# Patient Record
Sex: MALE | Race: WHITE | Employment: OTHER | URBAN - METROPOLITAN AREA
[De-identification: names, ages, dates, MRNs, and addresses within clinical notes are randomized per-mention and may not be internally consistent; named-entity substitution may affect disease eponyms.]

---

## 2017-11-09 ENCOUNTER — OFFICE VISIT (OUTPATIENT)
Dept: UROLOGY | Facility: CLINIC | Age: 82
End: 2017-11-09
Payer: COMMERCIAL

## 2017-11-09 VITALS
WEIGHT: 180 LBS | DIASTOLIC BLOOD PRESSURE: 70 MMHG | BODY MASS INDEX: 29.99 KG/M2 | HEART RATE: 60 BPM | HEIGHT: 65 IN | SYSTOLIC BLOOD PRESSURE: 140 MMHG

## 2017-11-09 DIAGNOSIS — R97.20 ELEVATED PROSTATE SPECIFIC ANTIGEN (PSA): Primary | ICD-10-CM

## 2017-11-09 DIAGNOSIS — N40.0 ENLARGED PROSTATE: ICD-10-CM

## 2017-11-09 LAB — PSA SERPL-MCNC: 0.76 NG/ML (ref 0–4)

## 2017-11-09 PROCEDURE — 84153 ASSAY OF PSA TOTAL: CPT | Performed by: UROLOGY

## 2017-11-09 PROCEDURE — 99212 OFFICE O/P EST SF 10 MIN: CPT | Performed by: UROLOGY

## 2017-11-09 PROCEDURE — 36415 COLL VENOUS BLD VENIPUNCTURE: CPT | Performed by: UROLOGY

## 2017-11-09 RX ORDER — DUTASTERIDE 0.5 MG/1
0.5 CAPSULE, LIQUID FILLED ORAL DAILY
Qty: 90 CAPSULE | Refills: 3 | Status: SHIPPED | OUTPATIENT
Start: 2017-11-09

## 2017-11-09 RX ORDER — TAMSULOSIN HYDROCHLORIDE 0.4 MG/1
0.8 CAPSULE ORAL DAILY
Qty: 90 CAPSULE | Refills: 3 | Status: SHIPPED | OUTPATIENT
Start: 2017-11-09

## 2017-11-09 ASSESSMENT — PAIN SCALES - GENERAL: PAINLEVEL: MODERATE PAIN (5)

## 2017-11-09 NOTE — MR AVS SNAPSHOT
"              After Visit Summary   11/9/2017    Cristopher Guillen    MRN: [id number]           Patient Information     Date Of Birth          1935        Visit Information        Provider Department      11/9/2017 10:30 AM Sharif Loaiza MD; [employer] [facility] [place]        Today's Diagnoses     Elevated prostate specific antigen (PSA)    -  1    Enlarged prostate           Follow-ups after your visit        Follow-up notes from your care team     Return if symptoms worsen or fail to improve.      Who to contact     If you have questions or need follow up information about today's clinic visit or your schedule please contact [facility] directly at [phone].  Normal or non-critical lab and imaging results will be communicated to you by MyChart, letter or phone within 4 business days after the clinic has received the results. If you do not hear from us within 7 days, please contact the clinic through [employer] or phone. If you have a critical or abnormal lab result, we will notify you by phone as soon as possible.  Submit refill requests through [employer] or call your pharmacy and they will forward the refill request to us. Please allow 3 business days for your refill to be completed.          Additional Information About Your Visit        MyChart Information     [employer] lets you send messages to your doctor, view your test results, renew your prescriptions, schedule appointments and more. To sign up, go to [email or web address] . Click on \"Log in\" on the left side of the screen, which will take you to the Welcome page. Then click on \"Sign up Now\" on the right side of the page.     You will be asked to enter the access code listed below, as well as some personal information. Please follow the directions to create your username and password.     Your access code is: [email or web address]  Expires: 2/7/2018 11:22 AM   " "  Your access code will  in 90 days. If you need help or a new code, please call your [facility] or [phone].        Care EveryWhere ID     This is your Care EveryWhere ID. This could be used by other organizations to access your [facility] medical records  [id number]        Your Vitals Were     Pulse Height BMI (Body Mass Index)             60 1.651 m (5' 5\") 29.95 kg/m2          Blood Pressure from Last 3 Encounters:   17 140/70   16 118/58    Weight from Last 3 Encounters:   17 81.6 kg (180 lb)   16 83 kg (183 lb)              We Performed the Following     PSA Diag Urologic Phys          Today's Medication Changes          These changes are accurate as of: 17 11:22 AM.  If you have any questions, ask your nurse or doctor.               These medicines have changed or have updated prescriptions.        Dose/Directions    * dutasteride 0.5 MG capsule   Commonly known as:  AVODART   This may have changed:  Another medication with the same name was added. Make sure you understand how and when to take each.   Changed by:  Sharif Loaiza MD        Dose:  0.5 mg   Take 0.5 mg by mouth daily   Refills:  0       * dutasteride 0.5 MG capsule   Commonly known as:  AVODART   This may have changed:  You were already taking a medication with the same name, and this prescription was added. Make sure you understand how and when to take each.   Used for:  Enlarged prostate   Changed by:  Sharif Loaiza MD        Dose:  0.5 mg   Take 1 capsule (0.5 mg) by mouth daily   Quantity:  90 capsule   Refills:  3       * tamsulosin 80 mcg/mL Susp   Commonly known as:  FLOMAX   This may have changed:  Another medication with the same name was added. Make sure you understand how and when to take each.   Changed by:  Sharif Loaiza MD        Dose:  400 mcg   Take 400 mcg by mouth daily   Refills:  0       * tamsulosin 0.4 MG capsule   Commonly known as:  FLOMAX   This may " have changed:  You were already taking a medication with the same name, and this prescription was added. Make sure you understand how and when to take each.   Used for:  Enlarged prostate   Changed by:  Sharif Loaiza MD        Dose:  0.8 mg   Take 2 capsules (0.8 mg) by mouth daily   Quantity:  90 capsule   Refills:  3       * Notice:  This list has 4 medication(s) that are the same as other medications prescribed for you. Read the directions carefully, and ask your doctor or other care provider to review them with you.         Where to get your medicines      These medications were sent to [facility] [address] [place] [address] [address] [place] [address]  [address], [facility] [address]     Phone:  [phone]     dutasteride 0.5 MG capsule    tamsulosin 0.4 MG capsule                Primary Care Provider Office Phone # Fax #    Zane Andrade [phone]       [facility] [address]  [facility] [address]        Equal Access to Services     [facility] AH: Hadii aad ku hadasho Soomaali, waaxda luqadaha, qaybta kaalmada adeegyada, waxenrique beckwith . So [facility] [phone].    ATENCIÓN: Si habla español, tiene a agarwal disposición servicios gratuitos de asistencia lingüística. [facility] [phone].    We comply with applicable federal civil rights laws and [place] laws. We do not discriminate on the basis of race, color, national origin, age, disability, sex, sexual orientation, or gender identity.            Thank you!     Thank you for choosing [facility] [place]  for your care. Our goal is always to provide you with excellent care. Hearing back from our patients is one way we can continue to improve our services. Please take a few minutes to complete the written survey that you may receive in the mail after your visit with us. Thank you!             Your Updated Medication List  - Protect others around you: Learn how to safely use, store and throw away your medicines at www.disposemymeds.org.          This list is accurate as of: 11/9/17 11:22 AM.  Always use your most recent med list.                   Brand Name Dispense Instructions for use Diagnosis    CRESTOR PO      Take 20 mg by mouth 2 times daily        * dutasteride 0.5 MG capsule    AVODART     Take 0.5 mg by mouth daily        * dutasteride 0.5 MG capsule    AVODART    90 capsule    Take 1 capsule (0.5 mg) by mouth daily    Enlarged prostate       METOPROLOL TARTRATE PO      Take 50 mg by mouth 2 times daily        * nitroGLYcerin 0.1 MG/HR 24 hr patch    NITRODUR     Place 1 patch onto the skin daily        * NITROGLYCERIN SL      Place 0.4 mg under the tongue        PLAVIX PO      Take 75 mg by mouth daily        ranitidine 150 MG tablet    ZANTAC     Take 150 mg by mouth        * tamsulosin 80 mcg/mL Susp    FLOMAX     Take 400 mcg by mouth daily        * tamsulosin 0.4 MG capsule    FLOMAX    90 capsule    Take 2 capsules (0.8 mg) by mouth daily    Enlarged prostate       * Notice:  This list has 6 medication(s) that are the same as other medications prescribed for you. Read the directions carefully, and ask your doctor or other care provider to review them with you.

## 2017-11-09 NOTE — PROGRESS NOTES
"Office Visit Note  [facility]  [phone]    UROLOGIC DIAGNOSES:   Enlarged prostate and history of elevated PSA    CURRENT INTERVENTIONS:   Avodart and Flomax    HISTORY:   This is an 82-year-old woman who had previously seen Dr. kam for an enlarged prostate and elevated PSA. He takes Avodart and Flomax. His PSA has been low in recent years. The PSA is 0.76 today. He has no urinary symptoms or complaints today.      PAST MEDICAL HISTORY: No past medical history on file.    PAST SURGICAL HISTORY: No past surgical history on file.    FAMILY HISTORY: No family history on file.    SOCIAL HISTORY:   Social History   Substance Use Topics     Smoking status: Never Smoker     Smokeless tobacco: Never Used     Alcohol use Not on file       Current Outpatient Prescriptions   Medication     tamsulosin (FLOMAX) 80 mcg/mL     Clopidogrel Bisulfate (PLAVIX PO)     METOPROLOL TARTRATE PO     Rosuvastatin Calcium (CRESTOR PO)     dutasteride (AVODART) 0.5 MG capsule     nitroglycerin (NITRODUR) 0.1 MG/HR     NITROGLYCERIN SL     No current facility-administered medications for this visit.          PHYSICAL EXAM:    Ht 1.651 m (5' 5\")  Wt 81.6 kg (180 lb)  BMI 29.95 kg/m2    HEENT: Normocephalic and atraumatic   Cardiac: Not done  Back/Flank: Not done  CNS/PNS: Not done  Respiratory: Normal non-labored breathing  Abdomen: Soft nontender and nondistended  Peripheral Vascular: Not done  Mental Status: Not done    Penis: Not done  Scrotal Skin: Not done  Testicles: Not done  Epididymis: Not done  Digital Rectal Exam:     Cystoscopy: Not done    Imaging: None    Urinalysis: UA RESULTS:  Recent Labs   Lab Test  08/25/16   1024   COLOR  Yellow   APPEARANCE  Clear   URINEGLC  Negative   URINEBILI  Negative   URINEKETONE  Negative   SG  1.025   UBLD  Negative   URINEPH  5.5   PROTEIN  Negative   UROBILINOGEN  0.2   NITRITE  Negative   LEUKEST  Negative       PSA: 0.76    Post Void Residual:     Other labs: None " today      IMPRESSION:  Doing well    PLAN:  He is doing well with no issues. His PSA is very low and he is 82 years old. I recommended they stop PSA screening at this point. His medicines were refilled for him. In the future he will continue to have his Flomax and Avodart refilled with his primary care provider and he will follow-up with us as needed in the future.    Total Time: 10 minutes                                      Total in Consultation: 10 minutes      Sharif Loaiza M.D.

## 2017-11-09 NOTE — LETTER
"11/9/2017       RE: Cristopher Guillen  [address]  [place] [place] [facility] [address]     Dear Colleague,    Thank you for referring your patient, Cristopher Guillen, to the [facility] [place] at [facility]. Please see a copy of my visit note below.    Office Visit Note  [facility]  [phone]    UROLOGIC DIAGNOSES:   Enlarged prostate and history of elevated PSA    CURRENT INTERVENTIONS:   Avodart and Flomax    HISTORY:   This is an 82-year-old woman who had previously seen Dr. kam for an enlarged prostate and elevated PSA. He takes Avodart and Flomax. His PSA has been low in recent years. The PSA is 0.76 today. He has no urinary symptoms or complaints today.      PAST MEDICAL HISTORY: No past medical history on file.    PAST SURGICAL HISTORY: No past surgical history on file.    FAMILY HISTORY: No family history on file.    SOCIAL HISTORY:   Social History   Substance Use Topics     Smoking status: Never Smoker     Smokeless tobacco: Never Used     Alcohol use Not on file       Current Outpatient Prescriptions   Medication     tamsulosin (FLOMAX) 80 mcg/mL     Clopidogrel Bisulfate (PLAVIX PO)     METOPROLOL TARTRATE PO     Rosuvastatin Calcium (CRESTOR PO)     dutasteride (AVODART) 0.5 MG capsule     nitroglycerin (NITRODUR) 0.1 MG/HR     NITROGLYCERIN SL     No current facility-administered medications for this visit.          PHYSICAL EXAM:    Ht 1.651 m (5' 5\")  Wt 81.6 kg (180 lb)  BMI 29.95 kg/m2    HEENT: Normocephalic and atraumatic   Cardiac: Not done  Back/Flank: Not done  CNS/PNS: Not done  Respiratory: Normal non-labored breathing  Abdomen: Soft nontender and nondistended  Peripheral Vascular: Not done  Mental Status: Not done    Penis: Not done  Scrotal Skin: Not done  Testicles: Not done  Epididymis: Not done  Digital Rectal Exam:     Cystoscopy: Not done    Imaging: None    Urinalysis: UA RESULTS:  Recent Labs "   Lab Test  08/25/16   1024   COLOR  Yellow   APPEARANCE  Clear   URINEGLC  Negative   URINEBILI  Negative   URINEKETONE  Negative   SG  1.025   UBLD  Negative   URINEPH  5.5   PROTEIN  Negative   UROBILINOGEN  0.2   NITRITE  Negative   LEUKEST  Negative       PSA: 0.76    Post Void Residual:     Other labs: None today      IMPRESSION:  Doing well    PLAN:  He is doing well with no issues. His PSA is very low and he is 82 years old. I recommended they stop PSA screening at this point. His medicines were refilled for him. In the future he will continue to have his Flomax and Avodart refilled with his primary care provider and he will follow-up with us as needed in the future.    Total Time: 10 minutes                                      Total in Consultation: 10 minutes      Again, thank you for allowing me to participate in the care of your patient.      Sincerely,    Sharif Loaiza MD

## 2017-11-09 NOTE — NURSING NOTE
Chief Complaint   Patient presents with     Psa Screening     Patient is here for 1 year exam and PSA.      Concepcion Durant LPN 11:10 AM November 9, 2017

## 2018-06-04 RX ORDER — BISACODYL 5 MG/1
5 TABLET, DELAYED RELEASE ORAL DAILY PRN
COMMUNITY

## 2018-06-04 RX ORDER — AMMONIUM LACTATE 12 G/100G
CREAM TOPICAL 2 TIMES DAILY
COMMUNITY

## 2018-06-04 RX ORDER — POLYETHYLENE GLYCOL 3350 17 G/17G
1 POWDER, FOR SOLUTION ORAL DAILY
COMMUNITY

## 2018-06-04 RX ORDER — MECLIZINE HCL 25MG 25 MG/1
25 TABLET, CHEWABLE ORAL EVERY 6 HOURS PRN
COMMUNITY

## 2018-06-05 ENCOUNTER — SURGERY (OUTPATIENT)
Age: 83
End: 2018-06-05

## 2018-06-05 ENCOUNTER — HOSPITAL ENCOUNTER (OUTPATIENT)
Facility: CLINIC | Age: 83
Discharge: HOME | End: 2018-06-05
Attending: OPHTHALMOLOGY | Admitting: OPHTHALMOLOGY
Payer: COMMERCIAL

## 2018-06-05 ENCOUNTER — ANESTHESIA EVENT (OUTPATIENT)
Dept: SURGERY | Facility: CLINIC | Age: 83
End: 2018-06-05
Payer: COMMERCIAL

## 2018-06-05 ENCOUNTER — ANESTHESIA (OUTPATIENT)
Dept: SURGERY | Facility: CLINIC | Age: 83
End: 2018-06-05
Payer: COMMERCIAL

## 2018-06-05 VITALS
WEIGHT: 186.95 LBS | DIASTOLIC BLOOD PRESSURE: 69 MMHG | OXYGEN SATURATION: 95 % | BODY MASS INDEX: 27.69 KG/M2 | RESPIRATION RATE: 18 BRPM | HEART RATE: 51 BPM | TEMPERATURE: 97 F | SYSTOLIC BLOOD PRESSURE: 134 MMHG | HEIGHT: 69 IN

## 2018-06-05 PROCEDURE — 25000128 H RX IP 250 OP 636: Performed by: ANESTHESIOLOGY

## 2018-06-05 PROCEDURE — 25000125 ZZHC RX 250: Performed by: NURSE ANESTHETIST, CERTIFIED REGISTERED

## 2018-06-05 PROCEDURE — 25000128 H RX IP 250 OP 636: Performed by: OPHTHALMOLOGY

## 2018-06-05 PROCEDURE — 25000125 ZZHC RX 250: Performed by: ANESTHESIOLOGY

## 2018-06-05 PROCEDURE — V2632 POST CHMBR INTRAOCULAR LENS: HCPCS | Performed by: OPHTHALMOLOGY

## 2018-06-05 PROCEDURE — 36000101 ZZH EYE SURGERY LEVEL 3 1ST 30 MIN: Performed by: OPHTHALMOLOGY

## 2018-06-05 PROCEDURE — 25000128 H RX IP 250 OP 636: Performed by: NURSE ANESTHETIST, CERTIFIED REGISTERED

## 2018-06-05 PROCEDURE — 37000008 ZZH ANESTHESIA TECHNICAL FEE, 1ST 30 MIN: Performed by: OPHTHALMOLOGY

## 2018-06-05 PROCEDURE — 27210794 ZZH OR GENERAL SUPPLY STERILE: Performed by: OPHTHALMOLOGY

## 2018-06-05 PROCEDURE — 71000028 ZZH EYE RECOVERY PHASE 2 EACH 15 MINS: Performed by: OPHTHALMOLOGY

## 2018-06-05 PROCEDURE — 40000170 ZZH STATISTIC PRE-PROCEDURE ASSESSMENT II: Performed by: OPHTHALMOLOGY

## 2018-06-05 PROCEDURE — 25000125 ZZHC RX 250: Performed by: OPHTHALMOLOGY

## 2018-06-05 DEVICE — EYE IMP IOL AMO PCL TECNIS ZCB00 19.0: Type: IMPLANTABLE DEVICE | Site: EYE | Status: FUNCTIONAL

## 2018-06-05 RX ORDER — LIDOCAINE HYDROCHLORIDE 10 MG/ML
INJECTION, SOLUTION EPIDURAL; INFILTRATION; INTRACAUDAL; PERINEURAL PRN
Status: DISCONTINUED | OUTPATIENT
Start: 2018-06-05 | End: 2018-06-05 | Stop reason: HOSPADM

## 2018-06-05 RX ORDER — MOXIFLOXACIN 5 MG/ML
1 SOLUTION/ DROPS OPHTHALMIC
Status: COMPLETED | OUTPATIENT
Start: 2018-06-05 | End: 2018-06-05

## 2018-06-05 RX ORDER — PROPARACAINE HYDROCHLORIDE 5 MG/ML
1 SOLUTION/ DROPS OPHTHALMIC ONCE
Status: COMPLETED | OUTPATIENT
Start: 2018-06-05 | End: 2018-06-05

## 2018-06-05 RX ORDER — SODIUM CHLORIDE, SODIUM LACTATE, POTASSIUM CHLORIDE, CALCIUM CHLORIDE 600; 310; 30; 20 MG/100ML; MG/100ML; MG/100ML; MG/100ML
INJECTION, SOLUTION INTRAVENOUS CONTINUOUS
Status: DISCONTINUED | OUTPATIENT
Start: 2018-06-05 | End: 2018-06-05 | Stop reason: HOSPADM

## 2018-06-05 RX ORDER — BALANCED SALT SOLUTION 6.4; .75; .48; .3; 3.9; 1.7 MG/ML; MG/ML; MG/ML; MG/ML; MG/ML; MG/ML
SOLUTION OPHTHALMIC PRN
Status: DISCONTINUED | OUTPATIENT
Start: 2018-06-05 | End: 2018-06-05 | Stop reason: HOSPADM

## 2018-06-05 RX ORDER — PHENYLEPHRINE HYDROCHLORIDE 25 MG/ML
1 SOLUTION/ DROPS OPHTHALMIC
Status: COMPLETED | OUTPATIENT
Start: 2018-06-05 | End: 2018-06-05

## 2018-06-05 RX ORDER — DICLOFENAC SODIUM 1 MG/ML
1 SOLUTION/ DROPS OPHTHALMIC
Status: COMPLETED | OUTPATIENT
Start: 2018-06-05 | End: 2018-06-05

## 2018-06-05 RX ORDER — TROPICAMIDE 10 MG/ML
1 SOLUTION/ DROPS OPHTHALMIC
Status: COMPLETED | OUTPATIENT
Start: 2018-06-05 | End: 2018-06-05

## 2018-06-05 RX ADMIN — EPINEPHRINE 500 ML: 1 INJECTION, SOLUTION, CONCENTRATE INTRAVENOUS at 12:18

## 2018-06-05 RX ADMIN — CEFUROXIME 0.1 ML: 1.5 INJECTION, POWDER, FOR SOLUTION INTRAVENOUS at 12:18

## 2018-06-05 RX ADMIN — MOXIFLOXACIN 1 DROP: 5 SOLUTION/ DROPS OPHTHALMIC at 09:50

## 2018-06-05 RX ADMIN — TROPICAMIDE 1 DROP: 10 SOLUTION/ DROPS OPHTHALMIC at 09:43

## 2018-06-05 RX ADMIN — DICLOFENAC SODIUM 1 DROP: 1 SOLUTION OPHTHALMIC at 09:43

## 2018-06-05 RX ADMIN — SODIUM CHLORIDE, POTASSIUM CHLORIDE, SODIUM LACTATE AND CALCIUM CHLORIDE: 600; 310; 30; 20 INJECTION, SOLUTION INTRAVENOUS at 09:49

## 2018-06-05 RX ADMIN — LIDOCAINE HYDROCHLORIDE 2 ML: 10 INJECTION, SOLUTION EPIDURAL; INFILTRATION; INTRACAUDAL; PERINEURAL at 09:50

## 2018-06-05 RX ADMIN — TROPICAMIDE 1 DROP: 10 SOLUTION/ DROPS OPHTHALMIC at 09:38

## 2018-06-05 RX ADMIN — PHENYLEPHRINE HYDROCHLORIDE 1 DROP: 2.5 SOLUTION/ DROPS OPHTHALMIC at 09:38

## 2018-06-05 RX ADMIN — PROPARACAINE HYDROCHLORIDE 1 DROP: 5 SOLUTION/ DROPS OPHTHALMIC at 09:38

## 2018-06-05 RX ADMIN — PHENYLEPHRINE HYDROCHLORIDE 1 DROP: 2.5 SOLUTION/ DROPS OPHTHALMIC at 09:43

## 2018-06-05 RX ADMIN — DEXMEDETOMIDINE HYDROCHLORIDE 4 MCG: 100 INJECTION, SOLUTION INTRAVENOUS at 12:13

## 2018-06-05 RX ADMIN — LIDOCAINE HYDROCHLORIDE 0.5 ML: 35 GEL OPHTHALMIC at 12:18

## 2018-06-05 RX ADMIN — PHENYLEPHRINE HYDROCHLORIDE 1 DROP: 2.5 SOLUTION/ DROPS OPHTHALMIC at 09:50

## 2018-06-05 RX ADMIN — Medication 1 ML: at 12:18

## 2018-06-05 RX ADMIN — BALANCED SALT SOLUTION 1 APPLICATOR: 6.4; .75; .48; .3; 3.9; 1.7 SOLUTION OPHTHALMIC at 12:18

## 2018-06-05 RX ADMIN — LIDOCAINE HYDROCHLORIDE 1 ML: 10 INJECTION, SOLUTION EPIDURAL; INFILTRATION; INTRACAUDAL; PERINEURAL at 12:19

## 2018-06-05 RX ADMIN — DEXMEDETOMIDINE HYDROCHLORIDE 8 MCG: 100 INJECTION, SOLUTION INTRAVENOUS at 12:05

## 2018-06-05 RX ADMIN — MOXIFLOXACIN 1 DROP: 5 SOLUTION/ DROPS OPHTHALMIC at 09:38

## 2018-06-05 RX ADMIN — MOXIFLOXACIN 1 DROP: 5 SOLUTION/ DROPS OPHTHALMIC at 09:43

## 2018-06-05 RX ADMIN — DICLOFENAC SODIUM 1 DROP: 1 SOLUTION OPHTHALMIC at 09:50

## 2018-06-05 RX ADMIN — TROPICAMIDE 1 DROP: 10 SOLUTION/ DROPS OPHTHALMIC at 09:50

## 2018-06-05 RX ADMIN — DICLOFENAC SODIUM 1 DROP: 1 SOLUTION OPHTHALMIC at 09:38

## 2018-06-05 NOTE — OP NOTE
Pre-Operative Diagnosis: Visually Significant Cataract, left eye    Post-Operative Diagnosis: Same    Procedure: Cataract Extraction with Intraocular Lens placement, left eye.    Surgeon: Hermes Rivers M.D.    Anesthesia: MAC, Topical.    Estimated blood loss: None.    Implant: ANDERSON model ZCB00 19.0 Diopter lens.    Complication: None.    Description of Procedure:  After informed consent was obtained, and operative site was marked, the patient was brought to the operative room and prepped and draped in the standard ophthalmic fashion.  A paracentesis was made.  Lidocaine 1% non preserved was injected into the anterior chamber.  The anterior chamber was filled with viscoelastic. A beveled incision was made.  A cystotome and utrata forceps were used to create a continuous capsulorhexis.  The lens was hydrodissected from the capsular bag until it rotated freely.  The lens was phacoemulsified in the stop-and-chop method.  The cortex was aspirated out of the eye.  The above-named lens was injected into the capsular bag after it was re-inflated with viscoelastic.  The viscoelastic was aspirated out of the eye.  The wounds were hydrated and found to be water-tight.   1 mg (1mg/0.1cc in non preserved sterile BSS)of cefuroxime was injected into the anterior chamber.  A drop of 5% betadine was placed on the ocular surface.  A drop of gatifloxacin 0.5%, prednisolone acetate 1%, and Ketorolac were placed in the eye.  The eye was shielded.     The patient tolerated the procedure well, and left the operative in stable and satisfactory condition.  He will follow up tomorrow in the eye clinic.

## 2018-06-05 NOTE — OR NURSING
[place] , Devang, present in Phase II.  Dr. Rivers spoke to pt and wife with instructions. Also discharge instructions reviewed with pt and wife with .    Discharged to go home via [employer] service [phone]. Pt and wife asked appropriate questions and verbalized understanding. Dr. Rivers's office was contacted to verify post op appointment time as pt did not know it. Scheduled for 0900, 6/6/18. Written on d/c instructions.

## 2018-06-05 NOTE — IP AVS SNAPSHOT
MRN:[id number]                      After Visit Summary   6/5/2018    Cristopher Guillen    MRN: [id number]           Thank you!     Thank you for choosing [facility] for your care. Our goal is always to provide you with excellent care. Hearing back from our patients is one way we can continue to improve our services. Please take a few minutes to complete the written survey that you may receive in the mail after you visit with us. Thank you!        Patient Information     Date Of Birth          1935        About your hospital stay     You were admitted on:  June 5, 2018 You last received care in the:  [facility]    You were discharged on:  June 5, 2018       Who to Call     For medical emergencies, please call 911.  For non-urgent questions about your medical care, please call your primary care provider or clinic, [phone]  For questions related to your surgery, please call your surgery clinic        Attending Provider     Provider Specialty    Hermes Rivers MD Ophthalmology       Primary Care Provider Office Phone # Fax #    Zane Andrade [phone]      Further instructions from your care team       [facility] Discharge  Instructions  Anesthesia ([facility])   Adult Discharge Instructions    For 24 hours after surgery    1. Get plenty of rest.  Make arrangements to have a responsible adult stay with you for at least 6 hours after you leave the hospital.  2. Do not drive or use heavy equipment for 24 hours.    3. Do not drink alcohol for 24 hours.  4. Do not sign legal documents or make important decisions for 24 hours.  5. Avoid strenuous or risky activities. You may feel lightheaded.  If so, sit for a few minutes before standing.  Have someone help you get up.   6. Conscious sedation patients may resume a regular diet..  7. Any questions of medical nature, call your physician.    Dr. Mirza  "Silvestre  [facility]  [phone]  Post Operative Cataract Instructions    If you have a clear eye shield on, you should wear the eye shield or glasses to protect the eye on the day of surgery and begin eye drops today as directed.           The clear eye shield should be worn overnight and brought to the clinic at your post op visit.    Do not rub the operated eye.    Light sensitivity may be noticed. Sunglasses may be worn for comfort.    Some discomfort and irritation may be noticed. Acetaminophen (Tylenol) or Ibuprofen (Advil) may be taken for discomfort. If pain persists please call Dr. Rivers's office.    Keep the operated eye dry. You may wash your hair, bathe or shower, but keep the operated eye closed while doing so. No swimming pools, hot tubs, saunas, etc. for 10 days.    Use medication exactly as prescribed by your doctor. You may restart your regular home medications.      Bring all eye medications with you to the clinic on your first post operative visit.    Call Dr. Rivers's office if any of the following should occur:      Any sudden vision changes    Nausea or severe headache    Increase in pain not controlled      Or signs of infection (pus, increasing redness or tenderness)        11/8/16    Pending Results     No orders found from 6/3/2018 to 6/6/2018.            Admission Information     Date & Time Provider Department Dept. Phone    6/5/2018 Hermes Rivers MD [facility] [phone]      Your Vitals Were     Blood Pressure Pulse Temperature Respirations Height Weight    135/59 51 97  F (36.1  C) (Temporal) 16 1.74 m (5' 8.5\") 84.8 kg (186 lb 15.2 oz)    Pulse Oximetry BMI (Body Mass Index)                95% 28.01 kg/m2          MyChart Information     [employer] lets you send messages to your doctor, view your test results, renew your prescriptions, schedule appointments and more. To sign up, go to [email or web address] . Click on \"Log in\" on the left side of " "the screen, which will take you to the Welcome page. Then click on \"Sign up Now\" on the right side of the page.     You will be asked to enter the access code listed below, as well as some personal information. Please follow the directions to create your username and password.     Your access code is: [email or web address]  Expires: 9/3/2018 12:31 PM     Your access code will  in 90 days. If you need help or a new code, please call your [place] clinic or [phone].        Care EveryWhere ID     This is your Care EveryWhere ID. This could be used by other organizations to access your [place] medical records  [id number]        Equal Access to Services     SOLEDAD MCCRAY : Yanelis Laurent, maagly hart, flip cuenca, suman arenas. So [facility] [phone].    ATENCIÓN: Si habla español, tiene a agarwal disposición servicios gratuitos de asistencia lingüística. Llame al [phone].    We comply with applicable federal civil rights laws and [place] laws. We do not discriminate on the basis of race, color, national origin, age, disability, sex, sexual orientation, or gender identity.               Review of your medicines      UNREVIEWED medicines. Ask your doctor about these medicines        Dose / Directions    AMBIEN PO        Dose:  5 mg   Take 5 mg by mouth nightly as needed for sleep   Refills:  0       ammonium lactate 12 % cream   Commonly known as:  AMLACTIN        Apply topically 2 times daily   Refills:  0       bisacodyl 5 MG EC tablet   Commonly known as:  DULCOLAX        Dose:  5 mg   Take 5 mg by mouth daily as needed for constipation   Refills:  0       CRESTOR PO        Dose:  20 mg   Take 20 mg by mouth daily   Refills:  0       * dutasteride 0.5 MG capsule   Commonly known as:  AVODART        Dose:  0.5 mg   Take 0.5 mg by mouth daily   Refills:  0       * dutasteride 0.5 MG capsule   Commonly known as:  AVODART   Used for:  Enlarged prostate        " Dose:  0.5 mg   Take 1 capsule (0.5 mg) by mouth daily   Quantity:  90 capsule   Refills:  3       LISINOPRIL PO        Dose:  10 mg   Take 10 mg by mouth   Refills:  0       meclizine 25 MG Chew        Dose:  25 mg   Take 25 mg by mouth every 6 hours as needed for dizziness   Refills:  0       METOPROLOL TARTRATE PO        Dose:  50 mg   Take 50 mg by mouth 2 times daily   Refills:  0       NITROGLYCERIN SL        Dose:  0.4 mg   Place 0.4 mg under the tongue   Refills:  0       PLAVIX PO        Dose:  75 mg   Take 75 mg by mouth daily   Refills:  0       polyethylene glycol Packet   Commonly known as:  MIRALAX/GLYCOLAX        Dose:  1 packet   Take 1 packet by mouth daily   Refills:  0       ranitidine 150 MG tablet   Commonly known as:  ZANTAC        Dose:  150 mg   Take 150 mg by mouth   Refills:  0       tamsulosin 0.4 MG capsule   Commonly known as:  FLOMAX   Used for:  Enlarged prostate        Dose:  0.8 mg   Take 2 capsules (0.8 mg) by mouth daily   Quantity:  90 capsule   Refills:  3       * Notice:  This list has 2 medication(s) that are the same as other medications prescribed for you. Read the directions carefully, and ask your doctor or other care provider to review them with you.             Protect others around you: Learn how to safely use, store and throw away your medicines at www.disposemymeds.org.             Medication List: This is a list of all your medications and when to take them. Check marks below indicate your daily home schedule. Keep this list as a reference.      Medications           Morning Afternoon Evening Bedtime As Needed    AMBIEN PO   Take 5 mg by mouth nightly as needed for sleep                                ammonium lactate 12 % cream   Commonly known as:  AMLACTIN   Apply topically 2 times daily                                bisacodyl 5 MG EC tablet   Commonly known as:  DULCOLAX   Take 5 mg by mouth daily as needed for constipation                                CRESTOR  PO   Take 20 mg by mouth daily                                * dutasteride 0.5 MG capsule   Commonly known as:  AVODART   Take 0.5 mg by mouth daily                                * dutasteride 0.5 MG capsule   Commonly known as:  AVODART   Take 1 capsule (0.5 mg) by mouth daily                                LISINOPRIL PO   Take 10 mg by mouth                                meclizine 25 MG Chew   Take 25 mg by mouth every 6 hours as needed for dizziness                                METOPROLOL TARTRATE PO   Take 50 mg by mouth 2 times daily                                NITROGLYCERIN SL   Place 0.4 mg under the tongue                                PLAVIX PO   Take 75 mg by mouth daily                                polyethylene glycol Packet   Commonly known as:  MIRALAX/GLYCOLAX   Take 1 packet by mouth daily                                ranitidine 150 MG tablet   Commonly known as:  ZANTAC   Take 150 mg by mouth                                tamsulosin 0.4 MG capsule   Commonly known as:  FLOMAX   Take 2 capsules (0.8 mg) by mouth daily                                * Notice:  This list has 2 medication(s) that are the same as other medications prescribed for you. Read the directions carefully, and ask your doctor or other care provider to review them with you.

## 2018-06-05 NOTE — DISCHARGE INSTRUCTIONS
[facility] Discharge  Instructions  Anesthesia ([facility])   Adult Discharge Instructions    For 24 hours after surgery    1. Get plenty of rest.  Make arrangements to have a responsible adult stay with you for at least 6 hours after you leave the hospital.  2. Do not drive or use heavy equipment for 24 hours.    3. Do not drink alcohol for 24 hours.  4. Do not sign legal documents or make important decisions for 24 hours.  5. Avoid strenuous or risky activities. You may feel lightheaded.  If so, sit for a few minutes before standing.  Have someone help you get up.   6. Conscious sedation patients may resume a regular diet..  7. Any questions of medical nature, call your physician.    Dr. Hermes Rivers  [facility]  [phone]  Post Operative Cataract Instructions    If you have a clear eye shield on, you should wear the eye shield or glasses to protect the eye on the day of surgery and begin eye drops today as directed.           The clear eye shield should be worn overnight and brought to the clinic at your post op visit.    Do not rub the operated eye.    Light sensitivity may be noticed. Sunglasses may be worn for comfort.    Some discomfort and irritation may be noticed. Acetaminophen (Tylenol) or Ibuprofen (Advil) may be taken for discomfort. If pain persists please call Dr. Rivers's office.    Keep the operated eye dry. You may wash your hair, bathe or shower, but keep the operated eye closed while doing so. No swimming pools, hot tubs, saunas, etc. for 10 days.    Use medication exactly as prescribed by your doctor. You may restart your regular home medications.      Bring all eye medications with you to the clinic on your first post operative visit.    Call Dr. Rivers's office if any of the following should occur:      Any sudden vision changes    Nausea or severe headache    Increase in pain not controlled      Or signs of infection (pus, increasing  redness or tenderness)        11/8/16

## 2018-06-05 NOTE — ANESTHESIA CARE TRANSFER NOTE
Patient: Cristopher WILVER Birman    Procedure(s):  LEFT EYE PHACOEMULSIFICATION CLEAR CORNEA WITH STANDARD INTRAOCULAR LENS IMPLANT - Wound Class: I-Clean    Diagnosis: CATARACT LEFT EYE   Diagnosis Additional Information: No value filed.    Anesthesia Type:   MAC     Note:  Airway :Room Air  Destination: EC.  Comments: Transferred to [facility] recovery room in recliner with armrests up, spontaneous respirations, O2 saturation maintained greater than 92% with oxygen via room air. All monitors and alarms on and functioning, clinically stable vital signs. Report given to recovery RN and questions answered. Patient alert and following verbal directions.      Vitals: (Last set prior to Anesthesia Care Transfer)    CRNA VITALS  6/5/2018 1150 - 6/5/2018 1222      6/5/2018             Pulse: 54    Ht Rate: 53    SpO2: 100 %    Resp Rate (set): 10                Electronically Signed By: ТАТЬЯНА Pollard CRNA  June 5, 2018  12:22 PM

## 2018-06-05 NOTE — ANESTHESIA POSTPROCEDURE EVALUATION
Patient: Cristopher L Birman    Procedure(s):  LEFT EYE PHACOEMULSIFICATION CLEAR CORNEA WITH STANDARD INTRAOCULAR LENS IMPLANT - Wound Class: I-Clean    Diagnosis:CATARACT LEFT EYE   Diagnosis Additional Information: No value filed.    Anesthesia Type:  MAC    Note:  Anesthesia Post Evaluation    Patient location during evaluation: PACU  Patient participation: Able to fully participate in evaluation  Level of consciousness: awake  Pain management: adequate  Airway patency: patent  Cardiovascular status: acceptable  Respiratory status: acceptable  Hydration status: acceptable  PONV: none     Anesthetic complications: None          Last vitals:  Vitals:    06/05/18 0944 06/05/18 1223 06/05/18 1241   BP: 169/70 135/59 134/69   Pulse: 52 51    Resp: 16 16 18   Temp: 36.1  C (97  F)     SpO2: 99% 95% 95%         Electronically Signed By: Lenore Banuelos MD  June 5, 2018  12:57 PM

## 2018-06-05 NOTE — ANESTHESIA PREPROCEDURE EVALUATION
Anesthesia Evaluation     . Pt has had prior anesthetic.     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 Taking blood thinners Pt has received instructions: . . . :. .      (-) angina and angina   METS/Exercise Tolerance:  3 - Able to walk 1-2 blocks without stopping   Hematologic:         Musculoskeletal:         GI/Hepatic:        (-) GERD   Renal/Genitourinary:     (+) BPH,       Endo:      (-) Type I DM   Psychiatric:         Infectious Disease:         Malignancy:         Other:                     Physical Exam      Airway   Mallampati: II  Neck ROM: full    Dental   (+) missing    Cardiovascular   Rhythm and rate: regular and normal      Pulmonary    breath sounds clear to auscultation                    Anesthesia Plan      History & Physical Review  History and physical reviewed and following examination; no interval change.    ASA Status:  3 .    NPO Status:  > 8 hours    Plan for MAC with Intravenous induction. Reason for MAC:  Procedure to face, neck, head or breast  PONV prophylaxis:  Ondansetron (or other 5HT-3)       Postoperative Care      Consents  Anesthetic plan, risks, benefits and alternatives discussed with:  Patient (intepreter used)..                          .

## (undated) DEVICE — EYE KNIFE SLIT XSTAR VISITEC 2.6MM 45DEG 373726

## (undated) DEVICE — EYE SOL BSS 500ML

## (undated) DEVICE — EYE PACK BVI READYPAK KIT #1

## (undated) DEVICE — EYE PACK CUSTOM ANTERIOR 30DEG TIP CENTURION PPK6682-04

## (undated) DEVICE — EYE SOL BSS 15ML BOTTLE 65079515

## (undated) DEVICE — EYE KNIFE VISITEC 1.1MM ANG 45DEG SIDEPORT 373710

## (undated) DEVICE — Device

## (undated) DEVICE — PACK CATARACT CUSTOM SO DALE SEY32CTFCX

## (undated) DEVICE — LINEN TOWEL PACK X5 5464

## (undated) DEVICE — EYE SHIELD PLASTIC

## (undated) DEVICE — GLOVE PROTEXIS MICRO 7.0  2D73PM70

## (undated) DEVICE — EYE CANN IRR 25GA HYDRODISSECTING 585037

## (undated) DEVICE — TAPE MICROPORE 2"X1.5YD 1530S-2

## (undated) DEVICE — GLOVE PROTEXIS MICRO 8.0  2D73PM80

## (undated) RX ORDER — LIDOCAINE HYDROCHLORIDE 10 MG/ML
INJECTION, SOLUTION EPIDURAL; INFILTRATION; INTRACAUDAL; PERINEURAL
Status: DISPENSED
Start: 2018-06-05